# Patient Record
Sex: FEMALE | Race: WHITE | NOT HISPANIC OR LATINO | URBAN - METROPOLITAN AREA
[De-identification: names, ages, dates, MRNs, and addresses within clinical notes are randomized per-mention and may not be internally consistent; named-entity substitution may affect disease eponyms.]

---

## 2020-07-14 ENCOUNTER — OFFICE VISIT (OUTPATIENT)
Dept: URBAN - METROPOLITAN AREA CLINIC 40 | Facility: CLINIC | Age: 50
End: 2020-07-14

## 2020-08-12 ENCOUNTER — OFFICE VISIT (OUTPATIENT)
Dept: URBAN - METROPOLITAN AREA CLINIC 40 | Facility: CLINIC | Age: 50
End: 2020-08-12
Payer: COMMERCIAL

## 2020-08-12 DIAGNOSIS — R10.13 EPIGASTRIC ABDOMINAL PAIN: ICD-10-CM

## 2020-08-12 DIAGNOSIS — K31.84 GASTROPARESIS: ICD-10-CM

## 2020-08-12 DIAGNOSIS — K76.0 FATTY LIVER: ICD-10-CM

## 2020-08-12 PROCEDURE — 99214 OFFICE O/P EST MOD 30 MIN: CPT | Performed by: INTERNAL MEDICINE

## 2020-08-12 PROCEDURE — G8417 CALC BMI ABV UP PARAM F/U: HCPCS | Performed by: INTERNAL MEDICINE

## 2020-08-12 PROCEDURE — G8427 DOCREV CUR MEDS BY ELIG CLIN: HCPCS | Performed by: INTERNAL MEDICINE

## 2020-08-12 PROCEDURE — 1036F TOBACCO NON-USER: CPT | Performed by: INTERNAL MEDICINE

## 2020-08-12 RX ORDER — PROMETHAZINE HYDROCHLORIDE 25 MG/1
TABLET ORAL
Qty: 0 | Refills: 0 | Status: ACTIVE | COMMUNITY
Start: 1900-01-01 | End: 1900-01-01

## 2020-08-12 RX ORDER — ONDANSETRON HYDROCHLORIDE 8 MG/1
1 TABLET AS NEEDED TABLET, FILM COATED ORAL
Qty: 90 | Refills: 0 | OUTPATIENT
Start: 2020-08-12

## 2020-08-12 RX ORDER — FAMOTIDINE 40 MG/1
1 TABLET AT BEDTIME TABLET ORAL ONCE A DAY
Qty: 90 TABLET | Refills: 1 | OUTPATIENT
Start: 2020-08-12

## 2020-08-12 RX ORDER — ESOMEPRAZOLE MAGNESIUM 40 MG
TAKE 1 CAPSULE (40 MG) BY ORAL ROUTE ONCE DAILY FOR 30 DAYS CAPSULE,DELAYED RELEASE (ENTERIC COATED) ORAL 1
Start: 2020-01-08

## 2020-08-12 NOTE — PHYSICAL EXAM SKIN:
no rashes , no suspicious lesions , no areas of discoloration , no jaundice present , good turgor , no masses , no tenderness on palpation
Passed

## 2020-08-12 NOTE — PHYSICAL EXAM GASTROINTESTINAL
Abdomen , soft, mild epigastric tenderness, nondistended , no guarding or rigidity , no masses palpable , normal bowel sounds , Liver and Spleen , no hepatomegaly present , no hepatosplenomegaly , liver nontender , spleen not palpable

## 2020-08-12 NOTE — PHYSICAL EXAM CONSTITUTIONAL:
Obese WF well developed, well nourished , in no acute distress , ambulating without difficulty , normal communication ability

## 2020-08-12 NOTE — HPI-TODAY'S VISIT:
Ms. Andre presents to clinic for 6-month follow-up of her gastroparesis and fatty liver.  Patient states that since her gallbladder was removed last fall a lot of her abdominal pain issues have gotten much better.  She still having issues with nausea mostly in the morning.  She is has some discomfort but she is using the Donnatal for still.  She is still on her Nexium.  She has occasional breakthrough reflux despite taking it.  She is moving her bowels regularly 3 times a day.  There is no blood in the stool.  As for her fatty liver she states she had labs at the [facility] clinic which were normal except for her lipids.

## 2020-09-23 ENCOUNTER — OFFICE VISIT (OUTPATIENT)
Dept: URBAN - METROPOLITAN AREA CLINIC 40 | Facility: CLINIC | Age: 50
End: 2020-09-23
Payer: COMMERCIAL

## 2020-09-23 DIAGNOSIS — K31.84 GASTROPARESIS: ICD-10-CM

## 2020-09-23 DIAGNOSIS — K76.0 FATTY LIVER: ICD-10-CM

## 2020-09-23 DIAGNOSIS — R20.0 NUMBNESS AND TINGLING: ICD-10-CM

## 2020-09-23 PROCEDURE — 82746 ASSAY OF FOLIC ACID SERUM: CPT | Performed by: INTERNAL MEDICINE

## 2020-09-23 PROCEDURE — G8417 CALC BMI ABV UP PARAM F/U: HCPCS | Performed by: INTERNAL MEDICINE

## 2020-09-23 PROCEDURE — G8427 DOCREV CUR MEDS BY ELIG CLIN: HCPCS | Performed by: INTERNAL MEDICINE

## 2020-09-23 PROCEDURE — 99214 OFFICE O/P EST MOD 30 MIN: CPT | Performed by: INTERNAL MEDICINE

## 2020-09-23 PROCEDURE — G9903 PT SCRN TBCO ID AS NON USER: HCPCS | Performed by: INTERNAL MEDICINE

## 2020-09-23 PROCEDURE — 82607 VITAMIN B-12: CPT | Performed by: INTERNAL MEDICINE

## 2020-09-23 RX ORDER — ONDANSETRON HYDROCHLORIDE 8 MG/1
1 TABLET AS NEEDED TABLET, FILM COATED ORAL
OUTPATIENT
Start: 2020-08-12

## 2020-09-23 RX ORDER — PROMETHAZINE HYDROCHLORIDE 25 MG/1
TABLET ORAL
Qty: 0 | Refills: 0 | Status: ACTIVE | COMMUNITY
Start: 1900-01-01

## 2020-09-23 RX ORDER — FAMOTIDINE 40 MG/1
1 TABLET AT BEDTIME TABLET ORAL ONCE A DAY
OUTPATIENT
Start: 2020-08-12

## 2020-09-23 RX ORDER — ESOMEPRAZOLE MAGNESIUM 40 MG
TAKE 1 CAPSULE (40 MG) BY ORAL ROUTE ONCE DAILY FOR 30 DAYS CAPSULE,DELAYED RELEASE (ENTERIC COATED) ORAL 1
Status: ACTIVE | COMMUNITY
Start: 2020-01-08

## 2020-09-23 RX ORDER — ESOMEPRAZOLE MAGNESIUM 40 MG
TAKE 1 CAPSULE (40 MG) BY ORAL ROUTE ONCE DAILY FOR 30 DAYS CAPSULE,DELAYED RELEASE (ENTERIC COATED) ORAL 1
OUTPATIENT
Start: 2020-01-08

## 2020-09-23 RX ORDER — FAMOTIDINE 40 MG/1
1 TABLET AT BEDTIME TABLET ORAL ONCE A DAY
Qty: 90 TABLET | Refills: 1 | Status: ACTIVE | COMMUNITY
Start: 2020-08-12

## 2020-09-23 RX ORDER — ONDANSETRON HYDROCHLORIDE 8 MG/1
1 TABLET AS NEEDED TABLET, FILM COATED ORAL
Qty: 90 | Refills: 0 | Status: ACTIVE | COMMUNITY
Start: 2020-08-12

## 2020-09-23 NOTE — HPI-TODAY'S VISIT:
Ms. Andre presents to clinic for an unscheduled follow-up.  We last saw her in August and she was  doing well on her medication for abdominal pain with Donnatal.  She was having some breakthrough with her reflux and we added famotidine to her Nexium.  Patient presents today due to issues with numbness and tingling in her legs and feet.  She has had this for  several months but it has gotten worse over the last few weeks.  She states her primary care physician at the [facility] to check lab work and ruled out diabetes.  She states she is overall feeling better with her GI tract.  The breakthrough reflux has been improved with the addition of the H2 blocker.  Overall she is feeling much better with her stomach since her gallbladder was removed.  She is moving her bowels daily.  No diarrhea.  She is increased her water intake and try to cut her sugar intake as well.

## 2020-11-16 ENCOUNTER — TELEPHONE ENCOUNTER (OUTPATIENT)
Dept: URBAN - METROPOLITAN AREA CLINIC 92 | Facility: CLINIC | Age: 50
End: 2020-11-16

## 2020-12-15 ENCOUNTER — OFFICE VISIT (OUTPATIENT)
Dept: URBAN - METROPOLITAN AREA CLINIC 40 | Facility: CLINIC | Age: 50
End: 2020-12-15
Payer: COMMERCIAL

## 2020-12-15 ENCOUNTER — LAB OUTSIDE AN ENCOUNTER (OUTPATIENT)
Dept: URBAN - METROPOLITAN AREA CLINIC 40 | Facility: CLINIC | Age: 50
End: 2020-12-15

## 2020-12-15 ENCOUNTER — WEB ENCOUNTER (OUTPATIENT)
Dept: URBAN - METROPOLITAN AREA CLINIC 40 | Facility: CLINIC | Age: 50
End: 2020-12-15

## 2020-12-15 DIAGNOSIS — K31.84 GASTROPARESIS: ICD-10-CM

## 2020-12-15 DIAGNOSIS — R10.13 EPIGASTRIC ABDOMINAL PAIN: ICD-10-CM

## 2020-12-15 PROCEDURE — G8417 CALC BMI ABV UP PARAM F/U: HCPCS | Performed by: INTERNAL MEDICINE

## 2020-12-15 PROCEDURE — G9903 PT SCRN TBCO ID AS NON USER: HCPCS | Performed by: INTERNAL MEDICINE

## 2020-12-15 PROCEDURE — G8483 FLU IMM NO ADMIN DOC REA: HCPCS | Performed by: INTERNAL MEDICINE

## 2020-12-15 PROCEDURE — 99214 OFFICE O/P EST MOD 30 MIN: CPT | Performed by: INTERNAL MEDICINE

## 2020-12-15 PROCEDURE — G8427 DOCREV CUR MEDS BY ELIG CLIN: HCPCS | Performed by: INTERNAL MEDICINE

## 2020-12-15 RX ORDER — ONDANSETRON HYDROCHLORIDE 8 MG/1
1 TABLET AS NEEDED TABLET, FILM COATED ORAL
OUTPATIENT
Start: 2020-08-12

## 2020-12-15 RX ORDER — ONDANSETRON HYDROCHLORIDE 8 MG/1
1 TABLET AS NEEDED TABLET, FILM COATED ORAL
COMMUNITY
Start: 2020-08-12

## 2020-12-15 RX ORDER — PROMETHAZINE HYDROCHLORIDE 25 MG/1
TABLET ORAL
Qty: 0 | Refills: 0 | COMMUNITY
Start: 1900-01-01

## 2020-12-15 RX ORDER — ESOMEPRAZOLE MAGNESIUM 40 MG
TAKE 1 CAPSULE (40 MG) BY ORAL ROUTE ONCE DAILY FOR 30 DAYS CAPSULE,DELAYED RELEASE (ENTERIC COATED) ORAL 1
OUTPATIENT
Start: 2020-01-08

## 2020-12-15 RX ORDER — ESOMEPRAZOLE MAGNESIUM 40 MG
TAKE 1 CAPSULE (40 MG) BY ORAL ROUTE ONCE DAILY FOR 30 DAYS CAPSULE,DELAYED RELEASE (ENTERIC COATED) ORAL 1
COMMUNITY
Start: 2020-01-08

## 2020-12-15 RX ORDER — FAMOTIDINE 40 MG/1
1 TABLET AT BEDTIME TABLET ORAL ONCE A DAY
COMMUNITY
Start: 2020-08-12

## 2020-12-15 NOTE — HPI-TODAY'S VISIT:
Ms. Andre presents to clinic for follow-up of gastroparesis.  Patient was last seen on September 23.  That appointment she was complaining of numbness and tingling in her hands and feet.  B12 was ordered.  She had this done at the [facility] and she states she was told those labs came back normal.  She  is still having the numbness and tingling.  She is doing okay in terms of her stomach on the Donnatal, Nexium, famotidine and Zofran combination she is on.  She actually had to stop the medication for short period of time after she suffered what was thought to be aspiration whenever she had regurgitation of acid.  She was treated with antibiotics and steroids that she finished a week ago.  She states she is eating 3 small meals a day and avoiding raw vegetables and fatty foods.  Whenever she does have pain it is mostly in the morning.  She occasionally has issues with swallowing mainly solid foods.  Her last EGD was July 12, 2018 where a small hiatal hernia was noted as well as gastritis.  She admits she was drinking a lot of energy drinks but has recently switched to just water and is drinking a gallon a day.

## 2021-01-22 ENCOUNTER — OFFICE VISIT (OUTPATIENT)
Dept: URBAN - METROPOLITAN AREA SURGERY CENTER 30 | Facility: SURGERY CENTER | Age: 51
End: 2021-01-22
Payer: COMMERCIAL

## 2021-01-22 DIAGNOSIS — K29.30 CHRONIC SUPERFICIAL GASTRITIS: ICD-10-CM

## 2021-01-22 DIAGNOSIS — K22.8 COLUMNAR-LINED ESOPHAGUS: ICD-10-CM

## 2021-01-22 PROCEDURE — G8907 PT DOC NO EVENTS ON DISCHARG: HCPCS | Performed by: INTERNAL MEDICINE

## 2021-01-22 PROCEDURE — 43239 EGD BIOPSY SINGLE/MULTIPLE: CPT | Performed by: INTERNAL MEDICINE

## 2021-02-01 ENCOUNTER — OFFICE VISIT (OUTPATIENT)
Dept: URBAN - METROPOLITAN AREA CLINIC 40 | Facility: CLINIC | Age: 51
End: 2021-02-01
Payer: COMMERCIAL

## 2021-02-01 DIAGNOSIS — R10.13 EPIGASTRIC ABDOMINAL PAIN: ICD-10-CM

## 2021-02-01 DIAGNOSIS — K29.70 GASTRITIS: ICD-10-CM

## 2021-02-01 DIAGNOSIS — K31.84 GASTROPARESIS: ICD-10-CM

## 2021-02-01 PROCEDURE — 1036F TOBACCO NON-USER: CPT | Performed by: INTERNAL MEDICINE

## 2021-02-01 PROCEDURE — G8427 DOCREV CUR MEDS BY ELIG CLIN: HCPCS | Performed by: INTERNAL MEDICINE

## 2021-02-01 PROCEDURE — G8417 CALC BMI ABV UP PARAM F/U: HCPCS | Performed by: INTERNAL MEDICINE

## 2021-02-01 PROCEDURE — 99213 OFFICE O/P EST LOW 20 MIN: CPT | Performed by: INTERNAL MEDICINE

## 2021-02-01 PROCEDURE — G8482 FLU IMMUNIZE ORDER/ADMIN: HCPCS | Performed by: INTERNAL MEDICINE

## 2021-02-01 RX ORDER — ONDANSETRON HYDROCHLORIDE 8 MG/1
1 TABLET AS NEEDED TABLET, FILM COATED ORAL
Status: ACTIVE | COMMUNITY
Start: 2020-08-12

## 2021-02-01 RX ORDER — PROMETHAZINE HYDROCHLORIDE 25 MG/1
TABLET ORAL
Qty: 0 | Refills: 0 | Status: ACTIVE | COMMUNITY
Start: 1900-01-01

## 2021-02-01 RX ORDER — FAMOTIDINE 40 MG/1
1 TABLET AT BEDTIME TABLET ORAL ONCE A DAY
Status: ACTIVE | COMMUNITY
Start: 2020-08-12

## 2021-02-01 RX ORDER — ESOMEPRAZOLE MAGNESIUM 40 MG/1
1 CAPSULE CAPSULE, DELAYED RELEASE ORAL ONCE A DAY
Qty: 90 | Refills: 0 | OUTPATIENT
Start: 2021-02-01

## 2021-02-01 RX ORDER — ESOMEPRAZOLE MAGNESIUM 40 MG
TAKE 1 CAPSULE (40 MG) BY ORAL ROUTE ONCE DAILY FOR 30 DAYS CAPSULE,DELAYED RELEASE (ENTERIC COATED) ORAL 1
Status: ACTIVE | COMMUNITY
Start: 2020-01-08

## 2021-02-01 RX ORDER — ESOMEPRAZOLE MAGNESIUM 40 MG
1 CAPSULE CAPSULE,DELAYED RELEASE (ENTERIC COATED) ORAL TWICE DAILY
Qty: 180 CAPSULE | Refills: 0 | OUTPATIENT
Start: 2020-01-08

## 2021-02-01 NOTE — PHYSICAL EXAM CONSTITUTIONAL:
Morbidly obese well developed, well nourished , in no acute distress , ambulating without difficulty , normal communication ability

## 2021-02-01 NOTE — HPI-TODAY'S VISIT:
Ms. Andre presents to clinic for follow-up.  She was last seen on December 15.  We are following her for sluggish gastric emptying along with functional dyspepsia.  She had an EGD on January 22.  This showed a small hiatal hernia.  Gastritis was noted.  Biopsies were negative for H. pylori.  Patient still is having breakthrough heartburn despite taking her Nexium.  She admits having some pickle juice as well as some citrus.  She states pickle juice did not really aggravate her symptoms but the  citrus certainly did.

## 2021-02-01 NOTE — PHYSICAL EXAM GASTROINTESTINAL
Abdomen , soft, mild diffuse abdominal tenderness nondistended , no guarding or rigidity , no masses palpable , normal bowel sounds , Liver and Spleen , no hepatomegaly present , no hepatosplenomegaly , liver nontender , spleen not palpable

## 2021-03-01 ENCOUNTER — TELEPHONE ENCOUNTER (OUTPATIENT)
Dept: URBAN - METROPOLITAN AREA CLINIC 92 | Facility: CLINIC | Age: 51
End: 2021-03-01

## 2021-03-01 RX ORDER — PHENOBARBITAL, HYOSCYAMINE SULFATE, ATROPINE SULFATE, SCOPOLAMINE HYDROBROMIDE 16.2; .1037; .0194; .0065 MG/1; MG/1; MG/1; MG/1
1 TABLET TABLET ORAL THREE TIMES A DAY
Qty: 270 TABLET | Refills: 0
End: 2021-05-30

## 2021-03-17 ENCOUNTER — OFFICE VISIT (OUTPATIENT)
Dept: URBAN - METROPOLITAN AREA CLINIC 40 | Facility: CLINIC | Age: 51
End: 2021-03-17

## 2021-03-17 RX ORDER — ONDANSETRON HYDROCHLORIDE 8 MG/1
1 TABLET AS NEEDED TABLET, FILM COATED ORAL
Status: ACTIVE | COMMUNITY
Start: 2020-08-12

## 2021-03-17 RX ORDER — FAMOTIDINE 40 MG/1
1 TABLET AT BEDTIME TABLET ORAL ONCE A DAY
Status: ACTIVE | COMMUNITY
Start: 2020-08-12

## 2021-03-17 RX ORDER — ESOMEPRAZOLE MAGNESIUM 40 MG/1
1 CAPSULE CAPSULE, DELAYED RELEASE ORAL ONCE A DAY
Qty: 90 | Refills: 0 | Status: ACTIVE | COMMUNITY
Start: 2021-02-01

## 2021-03-17 RX ORDER — PHENOBARBITAL, HYOSCYAMINE SULFATE, ATROPINE SULFATE, SCOPOLAMINE HYDROBROMIDE 16.2; .1037; .0194; .0065 MG/1; MG/1; MG/1; MG/1
1 TABLET TABLET ORAL THREE TIMES A DAY
Qty: 270 TABLET | Refills: 0 | Status: ACTIVE | COMMUNITY
End: 2021-05-30

## 2021-03-17 RX ORDER — PROMETHAZINE HYDROCHLORIDE 25 MG/1
TABLET ORAL
Qty: 0 | Refills: 0 | Status: ACTIVE | COMMUNITY
Start: 1900-01-01

## 2021-04-09 ENCOUNTER — TELEPHONE ENCOUNTER (OUTPATIENT)
Dept: URBAN - METROPOLITAN AREA CLINIC 74 | Facility: CLINIC | Age: 51
End: 2021-04-09

## 2021-04-09 RX ORDER — PHENOBARBITAL 16.2; .1037; .0065; .0194 MG/5ML; MG/5ML; MG/5ML; MG/5ML
5 ML ELIXIR ORAL TWICE DAILY
Qty: 180 | Refills: 1 | OUTPATIENT
Start: 2021-04-09 | End: 2021-10-06

## 2022-12-15 ENCOUNTER — OFFICE VISIT (OUTPATIENT)
Dept: URBAN - METROPOLITAN AREA CLINIC 40 | Facility: CLINIC | Age: 52
End: 2022-12-15
Payer: COMMERCIAL

## 2022-12-15 ENCOUNTER — ERX REFILL RESPONSE (OUTPATIENT)
Dept: URBAN - METROPOLITAN AREA CLINIC 40 | Facility: CLINIC | Age: 52
End: 2022-12-15

## 2022-12-15 VITALS
HEART RATE: 85 BPM | WEIGHT: 293 LBS | SYSTOLIC BLOOD PRESSURE: 130 MMHG | HEIGHT: 63 IN | DIASTOLIC BLOOD PRESSURE: 82 MMHG | BODY MASS INDEX: 51.91 KG/M2

## 2022-12-15 DIAGNOSIS — K76.0 FATTY LIVER: ICD-10-CM

## 2022-12-15 DIAGNOSIS — R10.13 EPIGASTRIC ABDOMINAL PAIN: ICD-10-CM

## 2022-12-15 DIAGNOSIS — K29.70 GASTRITIS: ICD-10-CM

## 2022-12-15 DIAGNOSIS — K44.9 HIATAL HERNIA: ICD-10-CM

## 2022-12-15 DIAGNOSIS — M54.9 BACK PAIN: ICD-10-CM

## 2022-12-15 DIAGNOSIS — Z90.49 S/P CHOLECYSTECTOMY: ICD-10-CM

## 2022-12-15 DIAGNOSIS — K31.84 GASTROPARESIS: ICD-10-CM

## 2022-12-15 DIAGNOSIS — E66.01 MORBID OBESITY: ICD-10-CM

## 2022-12-15 DIAGNOSIS — R10.30 LOWER ABDOMINAL PAIN: ICD-10-CM

## 2022-12-15 PROBLEM — 442461003 HISTORY OF EXCISION OF INTESTINAL STRUCTURE: Status: ACTIVE | Noted: 2022-12-15

## 2022-12-15 PROBLEM — 238136002 MORBID OBESITY: Status: ACTIVE | Noted: 2022-12-15

## 2022-12-15 PROCEDURE — 99214 OFFICE O/P EST MOD 30 MIN: CPT | Performed by: PHYSICIAN ASSISTANT

## 2022-12-15 RX ORDER — SULFAMETHOXAZOLE AND TRIMETHOPRIM 400; 80 MG/1; MG/1
1 TABLET TABLET ORAL ONCE A DAY
Status: ACTIVE | COMMUNITY

## 2022-12-15 RX ORDER — SUCRALFATE 1 G/1
1 TABLET ON AN EMPTY STOMACH TABLET ORAL TWICE A DAY
Qty: 60 | OUTPATIENT
Start: 2022-12-15 | End: 2023-01-14

## 2022-12-15 RX ORDER — DICYCLOMINE HYDROCHLORIDE 10 MG/1
2 CAPSULES CAPSULE ORAL THREE TIMES A DAY
Qty: 180 CAPSULE | Refills: 2

## 2022-12-15 RX ORDER — DICYCLOMINE HYDROCHLORIDE 10 MG/1
2 CAPSULES CAPSULE ORAL THREE TIMES A DAY
Status: ACTIVE | COMMUNITY

## 2022-12-15 RX ORDER — PROMETHAZINE HYDROCHLORIDE 25 MG/1
TABLET ORAL
Qty: 0 | Refills: 0 | Status: ACTIVE | COMMUNITY
Start: 1900-01-01

## 2022-12-15 RX ORDER — SUCRALFATE 1 G/10ML
10 ML ON AN EMPTY STOMACH SUSPENSION ORAL TWICE A DAY
Qty: 600 ML | Refills: 1 | OUTPATIENT

## 2022-12-15 RX ORDER — SUCRALFATE 1 G/1
1 TABLET ON AN EMPTY STOMACH TABLET ORAL TWICE A DAY
Qty: 60 | OUTPATIENT

## 2022-12-15 RX ORDER — LANSOPRAZOLE 30 MG/1
1 CAPSULE BEFORE A MEAL CAPSULE, DELAYED RELEASE ORAL ONCE A DAY
Status: ACTIVE | COMMUNITY

## 2022-12-15 RX ORDER — BETHANECHOL CHLORIDE 25 MG/1
1 TABLET 1 HOUR BEFORE OR 2 HOURS AFTER MEALS TABLET ORAL THREE TIMES A DAY
Qty: 90 TABLET | Refills: 2 | OUTPATIENT
Start: 2022-12-15

## 2022-12-15 RX ORDER — LANSOPRAZOLE 30 MG/1
1 CAPSULE BEFORE A MEAL CAPSULE, DELAYED RELEASE ORAL BID
Qty: 60 CAPSULE | Refills: 2

## 2022-12-15 RX ORDER — GABAPENTIN 300 MG/1
1 CAPSULE CAPSULE ORAL ONCE A DAY
Status: ACTIVE | COMMUNITY

## 2022-12-15 RX ORDER — ASCORBIC ACID 125 MG
AS DIRECTED TABLET,CHEWABLE ORAL
Status: ACTIVE | COMMUNITY

## 2023-02-13 ENCOUNTER — OFFICE VISIT (OUTPATIENT)
Dept: URBAN - METROPOLITAN AREA CLINIC 40 | Facility: CLINIC | Age: 53
End: 2023-02-13
Payer: COMMERCIAL

## 2023-02-13 VITALS
DIASTOLIC BLOOD PRESSURE: 96 MMHG | HEIGHT: 63 IN | BODY MASS INDEX: 51.91 KG/M2 | SYSTOLIC BLOOD PRESSURE: 130 MMHG | WEIGHT: 293 LBS | HEART RATE: 77 BPM

## 2023-02-13 DIAGNOSIS — Z12.11 COLON CANCER SCREENING: ICD-10-CM

## 2023-02-13 DIAGNOSIS — R10.13 EPIGASTRIC ABDOMINAL PAIN: ICD-10-CM

## 2023-02-13 DIAGNOSIS — K31.84 GASTROPARESIS: ICD-10-CM

## 2023-02-13 PROCEDURE — 99214 OFFICE O/P EST MOD 30 MIN: CPT | Performed by: INTERNAL MEDICINE

## 2023-02-13 RX ORDER — SUCRALFATE 1 G/10ML
10 ML ON AN EMPTY STOMACH SUSPENSION ORAL TWICE A DAY
Qty: 600 ML | Refills: 1 | Status: ACTIVE | COMMUNITY

## 2023-02-13 RX ORDER — LANSOPRAZOLE 30 MG/1
1 CAPSULE BEFORE A MEAL CAPSULE, DELAYED RELEASE ORAL BID
OUTPATIENT

## 2023-02-13 RX ORDER — LANSOPRAZOLE 30 MG/1
1 CAPSULE BEFORE A MEAL CAPSULE, DELAYED RELEASE ORAL BID
Qty: 60 CAPSULE | Refills: 2 | Status: ACTIVE | COMMUNITY

## 2023-02-13 RX ORDER — ASCORBIC ACID 125 MG
AS DIRECTED TABLET,CHEWABLE ORAL
Status: ACTIVE | COMMUNITY

## 2023-02-13 RX ORDER — DICYCLOMINE HYDROCHLORIDE 10 MG/1
2 CAPSULES CAPSULE ORAL THREE TIMES A DAY
OUTPATIENT

## 2023-02-13 RX ORDER — GABAPENTIN 300 MG/1
1 CAPSULE CAPSULE ORAL ONCE A DAY
Status: ACTIVE | COMMUNITY

## 2023-02-13 RX ORDER — BETHANECHOL CHLORIDE 50 MG/1
1 TABLET 1 HOUR BEFORE OR 2 HOURS AFTER MEALS TABLET ORAL THREE TIMES A DAY
Qty: 90 | OUTPATIENT
Start: 2023-02-13

## 2023-02-13 RX ORDER — SULFAMETHOXAZOLE AND TRIMETHOPRIM 400; 80 MG/1; MG/1
1 TABLET TABLET ORAL ONCE A DAY
Status: ACTIVE | COMMUNITY

## 2023-02-13 RX ORDER — PROMETHAZINE HYDROCHLORIDE 25 MG/1
TABLET ORAL
Qty: 0 | Refills: 0 | Status: ACTIVE | COMMUNITY
Start: 1900-01-01

## 2023-02-13 RX ORDER — BETHANECHOL CHLORIDE 25 MG/1
1 TABLET 1 HOUR BEFORE OR 2 HOURS AFTER MEALS TABLET ORAL THREE TIMES A DAY
Qty: 90 TABLET | Refills: 2 | Status: ACTIVE | COMMUNITY
Start: 2022-12-15

## 2023-02-13 RX ORDER — POLYETHYLENE GLYCOL 3350, SODIUM SULFATE, SODIUM CHLORIDE, POTASSIUM CHLORIDE, ASCORBIC ACID, SODIUM ASCORBATE 140-9-5.2G
AS DIRECTED KIT ORAL ONCE
Qty: 1 | Refills: 0 | OUTPATIENT
Start: 2023-02-13 | End: 2023-02-14

## 2023-02-13 RX ORDER — BETHANECHOL CHLORIDE 25 MG/1
1 TABLET 1 HOUR BEFORE OR 2 HOURS AFTER MEALS TABLET ORAL THREE TIMES A DAY
OUTPATIENT
Start: 2022-12-15

## 2023-02-13 RX ORDER — DICYCLOMINE HYDROCHLORIDE 10 MG/1
2 CAPSULES CAPSULE ORAL THREE TIMES A DAY
Qty: 180 CAPSULE | Refills: 2 | Status: ACTIVE | COMMUNITY

## 2023-02-13 NOTE — PHYSICAL EXAM GASTROINTESTINAL
Abdomen , soft, mild BENJI tenderness, nondistended , no guarding or rigidity , no masses palpable , normal bowel sounds , Liver and Spleen , no hepatomegaly present , no hepatosplenomegaly , liver nontender , spleen not palpable

## 2023-02-13 NOTE — PHYSICAL EXAM CONSTITUTIONAL:
Morbidly  obese WF well developed, well nourished , in no acute distress , ambulating without difficulty , normal communication ability

## 2023-02-13 NOTE — HPI-TODAY'S VISIT:
Ms. Andre presents to clinic for follow-up.  She was last seen by the physician assistant in December.  Recall patient is being followed for gastroparesis and hiatal hernia.  At her last appointment she was started on bethanechol for her gastroparesis.  Her lansoprazole was increased to twice daily dosage and she was started on dicyclomine.  Patient states the abdominal cramping she was experiencing has responded to the dicyclomine.  She states that she is no longer having any emesis but is still fairly nauseated.  She notes the nausea is especially worse if she takes her scopolamine patch off.  She states that she is perimenopausal.  The week before her period would have come she will have nausea as well as issues with constipation.  At the last appointment the patient had indicated she was having some rectal bleeding.  She indicated she had a prior colonoscopy.  Release of information was sent to [facility] for that report but they do not have a record of that being done with them on file.  She is still having occasional bleeding.

## 2023-02-15 ENCOUNTER — TELEPHONE ENCOUNTER (OUTPATIENT)
Dept: URBAN - METROPOLITAN AREA CLINIC 40 | Facility: CLINIC | Age: 53
End: 2023-02-15

## 2023-03-30 ENCOUNTER — OFFICE VISIT (OUTPATIENT)
Dept: URBAN - METROPOLITAN AREA SURGERY CENTER 30 | Facility: SURGERY CENTER | Age: 53
End: 2023-03-30

## 2023-04-15 NOTE — PHYSICAL EXAM GASTROINTESTINAL
Abdomen  Obese, soft, nontender, nondistended , no guarding or rigidity , no masses palpable , normal bowel sounds , Liver and Spleen,  no hepatosplenomegaly , liver nontender
Yes

## 2023-04-24 ENCOUNTER — OFFICE VISIT (OUTPATIENT)
Dept: URBAN - METROPOLITAN AREA CLINIC 40 | Facility: CLINIC | Age: 53
End: 2023-04-24

## 2023-05-03 ENCOUNTER — OFFICE VISIT (OUTPATIENT)
Dept: URBAN - METROPOLITAN AREA SURGERY CENTER 30 | Facility: SURGERY CENTER | Age: 53
End: 2023-05-03

## 2023-05-25 ENCOUNTER — DASHBOARD ENCOUNTERS (OUTPATIENT)
Age: 53
End: 2023-05-25

## 2023-05-31 ENCOUNTER — OFFICE VISIT (OUTPATIENT)
Dept: URBAN - METROPOLITAN AREA CLINIC 40 | Facility: CLINIC | Age: 53
End: 2023-05-31

## 2023-05-31 ENCOUNTER — WEB ENCOUNTER (OUTPATIENT)
Dept: URBAN - METROPOLITAN AREA CLINIC 40 | Facility: CLINIC | Age: 53
End: 2023-05-31

## 2023-05-31 RX ORDER — GABAPENTIN 300 MG/1
1 CAPSULE CAPSULE ORAL ONCE A DAY
Status: ACTIVE | COMMUNITY

## 2023-05-31 RX ORDER — SULFAMETHOXAZOLE AND TRIMETHOPRIM 400; 80 MG/1; MG/1
1 TABLET TABLET ORAL ONCE A DAY
Status: ACTIVE | COMMUNITY

## 2023-05-31 RX ORDER — LANSOPRAZOLE 30 MG/1
1 CAPSULE BEFORE A MEAL CAPSULE, DELAYED RELEASE ORAL BID
Status: ACTIVE | COMMUNITY

## 2023-05-31 RX ORDER — ASCORBIC ACID 125 MG
AS DIRECTED TABLET,CHEWABLE ORAL
Status: ACTIVE | COMMUNITY

## 2023-05-31 RX ORDER — BETHANECHOL CHLORIDE 50 MG/1
1 TABLET 1 HOUR BEFORE OR 2 HOURS AFTER MEALS TABLET ORAL THREE TIMES A DAY
Qty: 90 | Status: ACTIVE | COMMUNITY
Start: 2023-02-13

## 2023-05-31 RX ORDER — SUCRALFATE 1 G/10ML
10 ML ON AN EMPTY STOMACH SUSPENSION ORAL TWICE A DAY
Qty: 600 ML | Refills: 1 | Status: ACTIVE | COMMUNITY

## 2023-05-31 RX ORDER — PROMETHAZINE HYDROCHLORIDE 25 MG/1
TABLET ORAL
Qty: 0 | Refills: 0 | Status: ACTIVE | COMMUNITY
Start: 1900-01-01

## 2023-05-31 RX ORDER — DICYCLOMINE HYDROCHLORIDE 10 MG/1
2 CAPSULES CAPSULE ORAL THREE TIMES A DAY
Status: ACTIVE | COMMUNITY

## 2023-06-30 ENCOUNTER — OFFICE VISIT (OUTPATIENT)
Dept: URBAN - METROPOLITAN AREA CLINIC 40 | Facility: CLINIC | Age: 53
End: 2023-06-30

## 2023-06-30 RX ORDER — LANSOPRAZOLE 30 MG/1
1 CAPSULE BEFORE A MEAL CAPSULE, DELAYED RELEASE ORAL BID
Status: ACTIVE | COMMUNITY

## 2023-06-30 RX ORDER — BETHANECHOL CHLORIDE 50 MG/1
1 TABLET 1 HOUR BEFORE OR 2 HOURS AFTER MEALS TABLET ORAL THREE TIMES A DAY
Qty: 90 | OUTPATIENT

## 2023-06-30 RX ORDER — GABAPENTIN 300 MG/1
1 CAPSULE CAPSULE ORAL ONCE A DAY
Status: ACTIVE | COMMUNITY

## 2023-06-30 RX ORDER — DICYCLOMINE HYDROCHLORIDE 10 MG/1
2 CAPSULES CAPSULE ORAL THREE TIMES A DAY
OUTPATIENT

## 2023-06-30 RX ORDER — LANSOPRAZOLE 30 MG/1
1 CAPSULE BEFORE A MEAL CAPSULE, DELAYED RELEASE ORAL BID
OUTPATIENT

## 2023-06-30 RX ORDER — SUCRALFATE 1 G/10ML
10 ML ON AN EMPTY STOMACH SUSPENSION ORAL TWICE A DAY
Qty: 600 ML | Refills: 1 | Status: ACTIVE | COMMUNITY

## 2023-06-30 RX ORDER — DICYCLOMINE HYDROCHLORIDE 10 MG/1
2 CAPSULES CAPSULE ORAL THREE TIMES A DAY
Status: ACTIVE | COMMUNITY

## 2023-06-30 RX ORDER — ASCORBIC ACID 125 MG
AS DIRECTED TABLET,CHEWABLE ORAL
Status: ACTIVE | COMMUNITY

## 2023-06-30 RX ORDER — PROMETHAZINE HYDROCHLORIDE 25 MG/1
TABLET ORAL
Qty: 0 | Refills: 0 | Status: ACTIVE | COMMUNITY
Start: 1900-01-01

## 2023-06-30 RX ORDER — SULFAMETHOXAZOLE AND TRIMETHOPRIM 400; 80 MG/1; MG/1
1 TABLET TABLET ORAL ONCE A DAY
Status: ACTIVE | COMMUNITY

## 2023-06-30 RX ORDER — BETHANECHOL CHLORIDE 50 MG/1
1 TABLET 1 HOUR BEFORE OR 2 HOURS AFTER MEALS TABLET ORAL THREE TIMES A DAY
Qty: 90 | Status: ACTIVE | COMMUNITY
Start: 2023-02-13

## 2023-06-30 NOTE — PHYSICAL EXAM GASTROINTESTINAL
Abdomen  Obese, soft, nontender, nondistended , no guarding or rigidity , no masses palpable , normal bowel sounds , Liver and Spleen,  no hepatosplenomegaly , liver nontender

## 2023-06-30 NOTE — HPI-TODAY'S VISIT:
Ms. Andre presents to clinic for follow-up.  She was last seen by the physician assistant in December.  Recall patient is being followed for gastroparesis and hiatal hernia.  At her last appointment she was started on bethanechol for her gastroparesis.  Her lansoprazole was increased to twice daily dosage and she was started on dicyclomine.  Patient states the abdominal cramping she was experiencing has responded to the dicyclomine.  She states that she is no longer having any emesis but is still fairly nauseated.  She notes the nausea is especially worse if she takes her scopolamine patch off.  She states that she is perimenopausal.  The week before her period would have come she will have nausea as well as issues with constipation.  At the last appointment the patient had indicated she was having some rectal bleeding.  She indicated she had a prior colonoscopy.  Release of information was sent to [facility] for that report but they do not have a record of that being done with them on file.  She is still having occasional bleeding.

## 2023-09-05 ENCOUNTER — TELEPHONE ENCOUNTER (OUTPATIENT)
Dept: URBAN - METROPOLITAN AREA CLINIC 40 | Facility: CLINIC | Age: 53
End: 2023-09-05

## 2023-11-02 ENCOUNTER — OFFICE VISIT (OUTPATIENT)
Dept: URBAN - METROPOLITAN AREA CLINIC 40 | Facility: CLINIC | Age: 53
End: 2023-11-02

## 2024-05-28 ENCOUNTER — LAB OUTSIDE AN ENCOUNTER (OUTPATIENT)
Dept: URBAN - METROPOLITAN AREA CLINIC 40 | Facility: CLINIC | Age: 54
End: 2024-05-28

## 2024-05-28 ENCOUNTER — OFFICE VISIT (OUTPATIENT)
Dept: URBAN - METROPOLITAN AREA CLINIC 40 | Facility: CLINIC | Age: 54
End: 2024-05-28
Payer: COMMERCIAL

## 2024-05-28 VITALS
TEMPERATURE: 98.2 F | DIASTOLIC BLOOD PRESSURE: 82 MMHG | HEART RATE: 77 BPM | BODY MASS INDEX: 51.77 KG/M2 | SYSTOLIC BLOOD PRESSURE: 119 MMHG | WEIGHT: 292.2 LBS | HEIGHT: 63 IN

## 2024-05-28 DIAGNOSIS — K31.84 GASTROPARESIS: ICD-10-CM

## 2024-05-28 DIAGNOSIS — R10.13 EPIGASTRIC ABDOMINAL PAIN: ICD-10-CM

## 2024-05-28 DIAGNOSIS — Z12.11 COLON CANCER SCREENING: ICD-10-CM

## 2024-05-28 PROCEDURE — 99214 OFFICE O/P EST MOD 30 MIN: CPT | Performed by: INTERNAL MEDICINE

## 2024-05-28 RX ORDER — SUCRALFATE 1 G/10ML
10 ML ON AN EMPTY STOMACH SUSPENSION ORAL TWICE A DAY
OUTPATIENT

## 2024-05-28 RX ORDER — DICYCLOMINE HYDROCHLORIDE 10 MG/1
2 CAPSULES CAPSULE ORAL THREE TIMES A DAY
OUTPATIENT

## 2024-05-28 RX ORDER — LISINOPRIL 20 MG/1
1 TABLET TABLET ORAL ONCE A DAY
Refills: 0 | Status: ACTIVE | COMMUNITY
Start: 1900-01-01

## 2024-05-28 RX ORDER — POLYETHYLENE GLYCOL 3350, SODIUM SULFATE, SODIUM CHLORIDE, POTASSIUM CHLORIDE, ASCORBIC ACID, SODIUM ASCORBATE 140-9-5.2G
1000 ML KIT ORAL ONCE
Qty: 1 | Refills: 0 | OUTPATIENT
Start: 2024-05-28 | End: 2024-05-29

## 2024-05-28 RX ORDER — SUCRALFATE 1 G/10ML
10 ML ON AN EMPTY STOMACH SUSPENSION ORAL TWICE A DAY
Qty: 600 ML | Refills: 1 | Status: ACTIVE | COMMUNITY

## 2024-05-28 RX ORDER — GABAPENTIN 300 MG/1
1 CAPSULE CAPSULE ORAL ONCE A DAY
Status: ACTIVE | COMMUNITY

## 2024-06-04 ENCOUNTER — TELEPHONE ENCOUNTER (OUTPATIENT)
Dept: URBAN - METROPOLITAN AREA CLINIC 40 | Facility: CLINIC | Age: 54
End: 2024-06-04

## 2024-07-29 ENCOUNTER — TELEPHONE ENCOUNTER (OUTPATIENT)
Dept: URBAN - METROPOLITAN AREA CLINIC 40 | Facility: CLINIC | Age: 54
End: 2024-07-29

## 2024-08-01 ENCOUNTER — OFFICE VISIT (OUTPATIENT)
Dept: URBAN - METROPOLITAN AREA MEDICAL CENTER 9 | Facility: MEDICAL CENTER | Age: 54
End: 2024-08-01

## 2024-08-29 ENCOUNTER — OFFICE VISIT (OUTPATIENT)
Dept: URBAN - METROPOLITAN AREA CLINIC 40 | Facility: CLINIC | Age: 54
End: 2024-08-29

## 2024-10-03 ENCOUNTER — OFFICE VISIT (OUTPATIENT)
Dept: URBAN - METROPOLITAN AREA MEDICAL CENTER 9 | Facility: MEDICAL CENTER | Age: 54
End: 2024-10-03
Payer: COMMERCIAL

## 2024-10-03 DIAGNOSIS — R11.2 NAUSEA AND VOMITING: ICD-10-CM

## 2024-10-03 DIAGNOSIS — K29.50 CHRONIC GASTRITIS: ICD-10-CM

## 2024-10-03 DIAGNOSIS — K22.89 OTHER SPECIFIED DISEASE OF ESOPHAGUS: ICD-10-CM

## 2024-10-03 DIAGNOSIS — Z12.11 COLON CANCER SCREENING: ICD-10-CM

## 2024-10-03 PROCEDURE — G0121 COLON CA SCRN NOT HI RSK IND: HCPCS | Performed by: INTERNAL MEDICINE

## 2024-10-03 PROCEDURE — 43239 EGD BIOPSY SINGLE/MULTIPLE: CPT | Performed by: INTERNAL MEDICINE

## 2024-10-03 PROCEDURE — 0528F RCMND FLW-UP 10 YRS DOCD: CPT | Performed by: INTERNAL MEDICINE

## 2024-10-03 RX ORDER — LISINOPRIL 20 MG/1
1 TABLET TABLET ORAL ONCE A DAY
Refills: 0 | Status: ACTIVE | COMMUNITY
Start: 1900-01-01

## 2024-10-03 RX ORDER — SUCRALFATE 1 G/10ML
10 ML ON AN EMPTY STOMACH SUSPENSION ORAL TWICE A DAY
Status: ACTIVE | COMMUNITY

## 2024-10-03 RX ORDER — GABAPENTIN 300 MG/1
1 CAPSULE CAPSULE ORAL ONCE A DAY
Status: ACTIVE | COMMUNITY

## 2024-10-03 RX ORDER — DICYCLOMINE HYDROCHLORIDE 10 MG/1
2 CAPSULES CAPSULE ORAL THREE TIMES A DAY
Status: ACTIVE | COMMUNITY

## 2024-10-24 ENCOUNTER — OFFICE VISIT (OUTPATIENT)
Dept: URBAN - METROPOLITAN AREA CLINIC 40 | Facility: CLINIC | Age: 54
End: 2024-10-24
Payer: COMMERCIAL

## 2024-10-24 ENCOUNTER — LAB OUTSIDE AN ENCOUNTER (OUTPATIENT)
Dept: URBAN - METROPOLITAN AREA CLINIC 40 | Facility: CLINIC | Age: 54
End: 2024-10-24

## 2024-10-24 VITALS
SYSTOLIC BLOOD PRESSURE: 126 MMHG | TEMPERATURE: 98.4 F | DIASTOLIC BLOOD PRESSURE: 70 MMHG | WEIGHT: 277 LBS | HEIGHT: 63 IN | HEART RATE: 96 BPM | BODY MASS INDEX: 49.08 KG/M2

## 2024-10-24 DIAGNOSIS — R10.13 DYSPEPSIA: ICD-10-CM

## 2024-10-24 DIAGNOSIS — K31.84 GASTROPARESIS: ICD-10-CM

## 2024-10-24 PROCEDURE — 99214 OFFICE O/P EST MOD 30 MIN: CPT | Performed by: PHYSICIAN ASSISTANT

## 2024-10-24 RX ORDER — GABAPENTIN 300 MG/1
1 CAPSULE CAPSULE ORAL ONCE A DAY
Status: ACTIVE | COMMUNITY

## 2024-10-24 RX ORDER — SUCRALFATE 1 G/10ML
10 ML ON AN EMPTY STOMACH SUSPENSION ORAL TWICE A DAY
Status: ON HOLD | COMMUNITY

## 2024-10-24 RX ORDER — LISINOPRIL 20 MG/1
1 TABLET TABLET ORAL ONCE A DAY
Refills: 0 | Status: ACTIVE | COMMUNITY

## 2024-10-24 RX ORDER — DICYCLOMINE HYDROCHLORIDE 10 MG/1
2 CAPSULES CAPSULE ORAL THREE TIMES A DAY
Status: ACTIVE | COMMUNITY

## 2024-10-24 RX ORDER — ONDANSETRON HYDROCHLORIDE 4 MG/1
1 TABLET TABLET, FILM COATED ORAL ONCE A DAY
Status: ACTIVE | COMMUNITY

## 2024-10-24 RX ORDER — EZETIMIBE 10 MG/1
1 TABLET TABLET ORAL ONCE A DAY
Status: ACTIVE | COMMUNITY

## 2024-10-24 RX ORDER — ROSUVASTATIN CALCIUM 10 MG/1
1 TABLET TABLET, COATED ORAL ONCE A DAY
Status: ACTIVE | COMMUNITY

## 2024-10-24 RX ORDER — NORTRIPTYLINE HYDROCHLORIDE 10 MG/1
1 CAPSULE AT BEDTIME CAPSULE ORAL ONCE A DAY
Qty: 30 CAPSULE | Refills: 1 | OUTPATIENT
Start: 2024-10-24

## 2024-10-24 NOTE — HPI-TODAY'S VISIT:
Ms. Andre is a 54 year old White female who returns for f/u, last seen in office 5/28/24. We are following her for gastroparesis.  She has a history of failing Reglan as well as erythromycin.  At that appointment we increased her bethanechol from 25 mg to 50 mg.  She states this got significant issues with dizziness.  She is off all medication and is trying to manage things with diet.  As for her reflux her primary care physician changed her from Prevacid to esomeprazole 20 mg daily along with Carafate as needed.  She is still using dicyclomine.  Recall her EGD in January 2021 showed a small hiatal hernia as well as erosive gastritis.  Patient has small hiatal hernia and mild gastritis noted with EGD with biopsies October 3, 2024.  Gastric biopsies with mild chronic inflammation.  No H. pylori.  Small hiatal hernia noted.  Benign esophageal biopsies.  Patient also had a colonoscopy with no evidence of colorectal neoplasia.  Nonbleeding internal hemorrhoids and external hemorrhoids noted.  Remaining exam was normal and 10-year follow-up for screening recommended.  Patient has some improved symptoms stating that she has lost weight as she was able to monitor her diet but also doing water aerobics.  She will soon resume this physical activity.  Still complains of some nausea.  No current vomiting.  Admits that for many years she has avoided fiber altogether with the exception of her sourdough breads and certain vegetables.  Off of Adipex, discontinued back in September.  She continues to do well overall on her esomeprazole.

## 2024-11-08 ENCOUNTER — OFFICE VISIT (OUTPATIENT)
Dept: URBAN - METROPOLITAN AREA TELEHEALTH 2 | Facility: TELEHEALTH | Age: 54
End: 2024-11-08
Payer: COMMERCIAL

## 2024-11-08 DIAGNOSIS — K31.84 GASTROPARESIS: ICD-10-CM

## 2024-11-08 PROCEDURE — 97802 MEDICAL NUTRITION INDIV IN: CPT | Performed by: DIETITIAN, REGISTERED

## 2024-11-08 RX ORDER — NORTRIPTYLINE HYDROCHLORIDE 10 MG/1
1 CAPSULE AT BEDTIME CAPSULE ORAL ONCE A DAY
Qty: 30 CAPSULE | Refills: 1 | Status: ACTIVE | COMMUNITY
Start: 2024-10-24

## 2024-11-08 RX ORDER — ONDANSETRON HYDROCHLORIDE 4 MG/1
1 TABLET TABLET, FILM COATED ORAL ONCE A DAY
Status: ACTIVE | COMMUNITY

## 2024-11-08 RX ORDER — LISINOPRIL 20 MG/1
1 TABLET TABLET ORAL ONCE A DAY
Refills: 0 | Status: ACTIVE | COMMUNITY

## 2024-11-08 RX ORDER — SUCRALFATE 1 G/10ML
10 ML ON AN EMPTY STOMACH SUSPENSION ORAL TWICE A DAY
Status: ON HOLD | COMMUNITY

## 2024-11-08 RX ORDER — GABAPENTIN 300 MG/1
1 CAPSULE CAPSULE ORAL ONCE A DAY
Status: ACTIVE | COMMUNITY

## 2024-11-08 RX ORDER — ROSUVASTATIN CALCIUM 10 MG/1
1 TABLET TABLET, COATED ORAL ONCE A DAY
Status: ACTIVE | COMMUNITY

## 2024-11-08 RX ORDER — DICYCLOMINE HYDROCHLORIDE 10 MG/1
2 CAPSULES CAPSULE ORAL THREE TIMES A DAY
Status: ACTIVE | COMMUNITY

## 2024-11-08 RX ORDER — EZETIMIBE 10 MG/1
1 TABLET TABLET ORAL ONCE A DAY
Status: ACTIVE | COMMUNITY

## 2024-12-20 ENCOUNTER — OFFICE VISIT (OUTPATIENT)
Dept: URBAN - METROPOLITAN AREA TELEHEALTH 2 | Facility: TELEHEALTH | Age: 54
End: 2024-12-20
Payer: COMMERCIAL

## 2024-12-20 DIAGNOSIS — K31.84 GASTROPARESIS: ICD-10-CM

## 2024-12-20 DIAGNOSIS — R10.13 DYSPEPSIA: ICD-10-CM

## 2024-12-20 PROCEDURE — 99213 OFFICE O/P EST LOW 20 MIN: CPT | Performed by: PHYSICIAN ASSISTANT

## 2024-12-20 RX ORDER — GABAPENTIN 300 MG/1
1 CAPSULE CAPSULE ORAL ONCE A DAY
Status: ACTIVE | COMMUNITY

## 2024-12-20 RX ORDER — PANTOPRAZOLE SODIUM 40 MG/1
1 TABLET 1/2 TO 1 HOUR BEFORE MORNING MEAL TABLET, DELAYED RELEASE ORAL ONCE A DAY
Status: ACTIVE | COMMUNITY

## 2024-12-20 RX ORDER — DICYCLOMINE HYDROCHLORIDE 10 MG/1
2 CAPSULES CAPSULE ORAL THREE TIMES A DAY
Status: ACTIVE | COMMUNITY

## 2024-12-20 RX ORDER — PANTOPRAZOLE SODIUM 40 MG/1
1 TABLET 1/2 TO 1 HOUR BEFORE MORNING MEAL TABLET, DELAYED RELEASE ORAL ONCE A DAY
OUTPATIENT

## 2024-12-20 RX ORDER — ONDANSETRON HYDROCHLORIDE 4 MG/1
1 TABLET TABLET, FILM COATED ORAL ONCE A DAY
Status: ACTIVE | COMMUNITY

## 2024-12-20 RX ORDER — SUCRALFATE 1 G/10ML
10 ML ON AN EMPTY STOMACH SUSPENSION ORAL TWICE A DAY
Status: ON HOLD | COMMUNITY

## 2024-12-20 RX ORDER — EZETIMIBE 10 MG/1
1 TABLET TABLET ORAL ONCE A DAY
Status: ACTIVE | COMMUNITY

## 2024-12-20 RX ORDER — LISINOPRIL 20 MG/1
1 TABLET TABLET ORAL ONCE A DAY
Refills: 0 | Status: ACTIVE | COMMUNITY

## 2024-12-20 RX ORDER — NORTRIPTYLINE HYDROCHLORIDE 10 MG/1
1 CAPSULE AT BEDTIME CAPSULE ORAL ONCE A DAY
Qty: 30 CAPSULE | Refills: 2
Start: 2024-10-24

## 2024-12-20 RX ORDER — ROSUVASTATIN CALCIUM 10 MG/1
1 TABLET TABLET, COATED ORAL ONCE A DAY
Status: ACTIVE | COMMUNITY

## 2024-12-20 RX ORDER — NORTRIPTYLINE HYDROCHLORIDE 10 MG/1
1 CAPSULE AT BEDTIME CAPSULE ORAL ONCE A DAY
Qty: 30 CAPSULE | Refills: 1 | Status: ACTIVE | COMMUNITY
Start: 2024-10-24

## 2024-12-20 NOTE — HPI-TODAY'S VISIT:
Ms. Andre is a 54 year old White female who returns for f/u, last seen 10/24/24. We are following her for gastroparesis.  She has a history of failing Reglan as well as erythromycin.   Recall her EGD in January 2021 showed a small hiatal hernia as well as erosive gastritis.  Patient has small hiatal hernia and mild gastritis noted with EGD with biopsies October 3, 2024.  Gastric biopsies with mild chronic inflammation.  No H. pylori.  Small hiatal hernia noted.  Benign esophageal biopsies.  Patient also had a colonoscopy with no evidence of colorectal neoplasia.  Nonbleeding internal hemorrhoids and external hemorrhoids noted.  Remaining exam was normal and 10-year follow-up for screening recommended.  Perimenopausal. Doing very well with nortriptyline 10mg and recently-added pantoprazole by her PMD. Nausea significantly improved. She has reduced refineds sugars, starches and artificial sweeteners in her water.

## 2025-02-18 ENCOUNTER — P2P PATIENT RECORD (OUTPATIENT)
Age: 55
End: 2025-02-18

## 2025-04-22 ENCOUNTER — TELEPHONE ENCOUNTER (OUTPATIENT)
Dept: URBAN - METROPOLITAN AREA CLINIC 40 | Facility: CLINIC | Age: 55
End: 2025-04-22

## 2025-04-22 RX ORDER — NORTRIPTYLINE HYDROCHLORIDE 10 MG/1
1 CAPSULE AT BEDTIME CAPSULE ORAL ONCE A DAY
Qty: 30 CAPSULE | Refills: 0
Start: 2024-10-24

## 2025-05-01 ENCOUNTER — OFFICE VISIT (OUTPATIENT)
Dept: URBAN - METROPOLITAN AREA CLINIC 40 | Facility: CLINIC | Age: 55
End: 2025-05-01
Payer: COMMERCIAL

## 2025-05-01 DIAGNOSIS — R10.13 DYSPEPSIA: ICD-10-CM

## 2025-05-01 DIAGNOSIS — Z90.49 HISTORY OF CHOLECYSTECTOMY: ICD-10-CM

## 2025-05-01 DIAGNOSIS — K31.84 GASTROPARESIS: ICD-10-CM

## 2025-05-01 PROCEDURE — 99213 OFFICE O/P EST LOW 20 MIN: CPT | Performed by: PHYSICIAN ASSISTANT

## 2025-05-01 RX ORDER — ONDANSETRON HYDROCHLORIDE 4 MG/1
1 TABLET TABLET, FILM COATED ORAL ONCE A DAY
Status: ACTIVE | COMMUNITY

## 2025-05-01 RX ORDER — ROSUVASTATIN CALCIUM 10 MG/1
1 TABLET TABLET, COATED ORAL ONCE A DAY
Status: ACTIVE | COMMUNITY

## 2025-05-01 RX ORDER — NORTRIPTYLINE HYDROCHLORIDE 10 MG/1
1 CAPSULE AT BEDTIME CAPSULE ORAL ONCE A DAY
Qty: 30 CAPSULE | Refills: 0 | Status: ACTIVE | COMMUNITY
Start: 2024-10-24

## 2025-05-01 RX ORDER — PANTOPRAZOLE SODIUM 40 MG/1
1 TABLET 1/2 TO 1 HOUR BEFORE MORNING MEAL TABLET, DELAYED RELEASE ORAL ONCE A DAY
Status: ACTIVE | COMMUNITY

## 2025-05-01 RX ORDER — SUCRALFATE 1 G/10ML
10 ML ON AN EMPTY STOMACH SUSPENSION ORAL TWICE A DAY
Status: ON HOLD | COMMUNITY

## 2025-05-01 RX ORDER — EZETIMIBE 10 MG/1
1 TABLET TABLET ORAL ONCE A DAY
Status: ACTIVE | COMMUNITY

## 2025-05-01 RX ORDER — NORTRIPTYLINE HYDROCHLORIDE 10 MG/1
1 CAPSULE AT BEDTIME CAPSULE ORAL ONCE A DAY
Qty: 30 CAPSULE | Refills: 0
Start: 2024-10-24

## 2025-05-01 RX ORDER — DICYCLOMINE HYDROCHLORIDE 10 MG/1
2 CAPSULES CAPSULE ORAL THREE TIMES A DAY
Status: ACTIVE | COMMUNITY

## 2025-05-01 RX ORDER — LISINOPRIL 20 MG/1
1 TABLET TABLET ORAL ONCE A DAY
Refills: 0 | Status: ACTIVE | COMMUNITY

## 2025-05-01 RX ORDER — NORTRIPTYLINE HYDROCHLORIDE 25 MG/1
1 CAPSULE AT BEDTIME CAPSULE ORAL ONCE A DAY
Qty: 30 CAPSULE | Refills: 1 | OUTPATIENT
Start: 2025-05-01

## 2025-05-01 RX ORDER — GABAPENTIN 300 MG/1
1 CAPSULE CAPSULE ORAL ONCE A DAY
Status: ACTIVE | COMMUNITY

## 2025-05-01 NOTE — HPI-TODAY'S VISIT:
Ms. Andre is a 54 year old White female who returns for f/u, last seen 12/20/24. We are following her for gastroparesis.  She has a history of failing Reglan as well as erythromycin.   Recall her EGD in January 2021 showed a small hiatal hernia as well as erosive gastritis.  Patient has small hiatal hernia and mild gastritis noted with EGD with biopsies October 3, 2024.  Gastric biopsies with mild chronic inflammation.  No H. pylori.  Small hiatal hernia noted.  Benign esophageal biopsies.  Patient also had a colonoscopy with no evidence of colorectal neoplasia.  Nonbleeding internal hemorrhoids and external hemorrhoids noted.  Remaining exam was normal and 10-year follow-up for screening recommended.  Perimenopausal. Doing very well with nortriptyline 10mg and recently-added pantoprazole by her PMD. Nausea significantly improved. She has reduced refineds sugars, starches and artificial sweeteners in her water. Patient is now able to eat more vegetables with making modifications to her portion sizes.  Denies any new complaints.  Still some epigastric pain.  Admits recent trauma to the head and was seen in the ER and evaluated with normal CT imaging.  However, she does have a long history of some neuropathy and other nerve issues and hopefully will follow-up with neurology concerning this.   Patient requesting refill on nortriptyline as she recently ran out and noticed return of her epigastric abdominal pain and nausea, without vomiting.  Weight appears increased 10 pounds but patient does admit she has been working out more for strength through water aerobics.

## 2025-06-12 ENCOUNTER — TELEPHONE ENCOUNTER (OUTPATIENT)
Dept: URBAN - METROPOLITAN AREA CLINIC 74 | Facility: CLINIC | Age: 55
End: 2025-06-12

## 2025-06-30 ENCOUNTER — ERX REFILL RESPONSE (OUTPATIENT)
Dept: URBAN - METROPOLITAN AREA CLINIC 40 | Facility: CLINIC | Age: 55
End: 2025-06-30

## 2025-06-30 RX ORDER — NORTRIPTYLINE HYDROCHLORIDE 25 MG/1
1 CAPSULE AT BEDTIME CAPSULE ORAL ONCE A DAY
Qty: 30 CAPSULE | Refills: 1 | OUTPATIENT

## 2025-06-30 RX ORDER — NORTRIPTYLINE HYDROCHLORIDE 25 MG/1
TAKE ONE CAPSULE BY MOUTH AT BEDTIME CAPSULE ORAL
Qty: 30 CAPSULE | Refills: 1 | OUTPATIENT